# Patient Record
Sex: MALE | Race: WHITE | Employment: FULL TIME
[De-identification: names, ages, dates, MRNs, and addresses within clinical notes are randomized per-mention and may not be internally consistent; named-entity substitution may affect disease eponyms.]

---

## 2019-11-08 ENCOUNTER — HEALTH MAINTENANCE LETTER (OUTPATIENT)
Age: 34
End: 2019-11-08

## 2020-12-06 ENCOUNTER — HEALTH MAINTENANCE LETTER (OUTPATIENT)
Age: 35
End: 2020-12-06

## 2021-09-25 ENCOUNTER — HEALTH MAINTENANCE LETTER (OUTPATIENT)
Age: 36
End: 2021-09-25

## 2021-10-10 ENCOUNTER — APPOINTMENT (OUTPATIENT)
Dept: ULTRASOUND IMAGING | Facility: CLINIC | Age: 36
End: 2021-10-10
Attending: EMERGENCY MEDICINE
Payer: COMMERCIAL

## 2021-10-10 ENCOUNTER — HOSPITAL ENCOUNTER (EMERGENCY)
Facility: CLINIC | Age: 36
Discharge: HOME | End: 2021-10-10
Attending: EMERGENCY MEDICINE | Admitting: EMERGENCY MEDICINE
Payer: COMMERCIAL

## 2021-10-10 VITALS
HEART RATE: 79 BPM | SYSTOLIC BLOOD PRESSURE: 178 MMHG | HEIGHT: 71 IN | BODY MASS INDEX: 25.2 KG/M2 | WEIGHT: 180 LBS | OXYGEN SATURATION: 99 % | TEMPERATURE: 98.4 F | DIASTOLIC BLOOD PRESSURE: 92 MMHG | RESPIRATION RATE: 18 BRPM

## 2021-10-10 DIAGNOSIS — N50.812 TESTICULAR PAIN, LEFT: ICD-10-CM

## 2021-10-10 LAB
ALBUMIN UR-MCNC: NEGATIVE MG/DL
APPEARANCE UR: CLEAR
BILIRUB UR QL STRIP: NEGATIVE
COLOR UR AUTO: NORMAL
GLUCOSE UR STRIP-MCNC: NEGATIVE MG/DL
HGB UR QL STRIP: NEGATIVE
KETONES UR STRIP-MCNC: NEGATIVE MG/DL
LEUKOCYTE ESTERASE UR QL STRIP: NEGATIVE
NITRATE UR QL: NEGATIVE
PH UR STRIP: 7 [PH] (ref 5–7)
SP GR UR STRIP: 1.02 (ref 1–1.03)
UROBILINOGEN UR STRIP-MCNC: NORMAL MG/DL

## 2021-10-10 PROCEDURE — 87591 N.GONORRHOEAE DNA AMP PROB: CPT | Performed by: EMERGENCY MEDICINE

## 2021-10-10 PROCEDURE — 81003 URINALYSIS AUTO W/O SCOPE: CPT | Performed by: EMERGENCY MEDICINE

## 2021-10-10 PROCEDURE — 250N000013 HC RX MED GY IP 250 OP 250 PS 637: Performed by: EMERGENCY MEDICINE

## 2021-10-10 PROCEDURE — 76870 US EXAM SCROTUM: CPT

## 2021-10-10 PROCEDURE — 99284 EMERGENCY DEPT VISIT MOD MDM: CPT | Mod: 25

## 2021-10-10 PROCEDURE — 87491 CHLMYD TRACH DNA AMP PROBE: CPT | Performed by: EMERGENCY MEDICINE

## 2021-10-10 RX ORDER — OXYCODONE HYDROCHLORIDE 5 MG/1
5 TABLET ORAL ONCE
Status: COMPLETED | OUTPATIENT
Start: 2021-10-10 | End: 2021-10-10

## 2021-10-10 RX ADMIN — OXYCODONE HYDROCHLORIDE 5 MG: 5 TABLET ORAL at 19:47

## 2021-10-10 ASSESSMENT — MIFFLIN-ST. JEOR: SCORE: 1778.6

## 2021-10-10 ASSESSMENT — ENCOUNTER SYMPTOMS
ABDOMINAL PAIN: 0
BACK PAIN: 0

## 2021-10-11 LAB
C TRACH DNA SPEC QL NAA+PROBE: NEGATIVE
N GONORRHOEA DNA SPEC QL NAA+PROBE: NEGATIVE

## 2021-10-11 NOTE — RESULT ENCOUNTER NOTE
Final result for both N. Gonorrhoeae PCR and Chlamydia Trachomatis PCR are NEGATIVE.  No treatment or change in treatment per [facility] ED Lab Result N. Gonorrhea AND/OR Chlamydia T. protocol.

## 2021-10-11 NOTE — DISCHARGE INSTRUCTIONS
*Rest, compression and elevation to the scrotum. Avoid straining and heavy lifting.  *Ibuprofen and/or tylenol as directed as needed for pain.  *Follow-up with urology in the next week as needed.  *Return if you develop worsening pain, urinary symptoms, fever, abdominal pain, vomiting or become worse in any way.

## 2021-10-11 NOTE — ED PROVIDER NOTES
"  History   Chief Complaint:  Testicular/scrotal Pain     The history is provided by the patient.      Priyank Kerns is a 36 year old male with history of epididymo-orchitis and left inguinal hernia who presents with testicular/scrotal pain. While driving here from [place] this morning, Priyank experienced a sudden onset of sharp left testicular pain that radiates upwards accompanied by intermittent periods of nausea. He denies experiencing any fever, urinary changes, back pain, or abdominal pain. He mentions history of left inguinal hernia surgery in 2020, although notes today's symptoms do not feel similar and has not had history of experiencing symptoms similar to today's. Reports occasional drinking. No allergies to medications or other medical problems.     Review of Systems   Gastrointestinal: Negative for abdominal pain.   Genitourinary: Positive for testicular pain.   Musculoskeletal: Negative for back pain.   All other systems reviewed and are negative.    Allergies:  The patient has no known allergies.     Medications:  The patient denies currently taking any medications.     Past Medical History:     Orchitis, epididymitis, and epididymo-orchitis   Left inguinal hernia  Quadriceps tendonitis  Syncope episode  Rectal bleeding   Myalgia  Elevated CK  Elevated aldolase level    Past Surgical History:    Inguinal hernia repair     Social History:  Unnacompanied in the ED today.  His  dropped him off and will pick him up.  Occasional drinker, no tobacco use.   Has twin 16-month-old sons.    Physical Exam     Patient Vitals for the past 24 hrs:   BP Temp Temp src Pulse Resp SpO2 Height Weight   10/10/21 1657 (!) 178/92 98.4  F (36.9  C) Oral 79 18 99 % 1.803 m (5' 11\") 81.6 kg (180 lb)     Physical Exam  General: Well-nourished, appears to be uncomfortable when I enter the room  Eyes: PERRL, conjunctivae pink no scleral icterus or conjunctival injection  ENT:  Moist mucus membranes, posterior " oropharynx clear without erythema or exudates  Respiratory:  Lungs clear to auscultation bilaterally, no crackles/rubs/wheezes.  Good air movement  CV: Normal rate and rhythm, no murmurs/rubs/gallops  GI:  Abdomen soft and non-distended.  Normoactive BS.  No tenderness, guarding or rebound  : Circumcised.  No blood or discharge at the meatus.  No sores or redness.  Minimal testicular tenderness on the left.  No right-sided testicular tenderness.  Normal lie and normal cremasteric reflex bilaterally.  No overlying redness or warmth.  No apparent swelling.  No palpable inguinal hernia.  Skin: Warm, dry.  No rashes or petechiae  Musculoskeletal: No peripheral edema or calf tenderness  Neuro: Alert and oriented to person/place/time  Psychiatric: Normal affect      Emergency Department Course     Imaging:  US Testicular & Scrotum w Doppler Ltd  Final Result  IMPRESSION:  1.  Normal ultrasound of the scrotum.  Report per radiology    Laboratory:  UA with microscopic: all WNL     Emergency Department Course:  Reviewed:  I reviewed nursing notes, vitals, past medical history and Care Everywhere    Assessments:  1930 I obtained history and examined the patient as noted above.    I rechecked the patient and explained findings.     Interventions:  1947 Oxycodone 5 mg PO    Disposition:  The patient was discharged to home.     Impression & Plan     Medical Decision Making:  Priyank Kerns is a 36 year old male who comes with testicular pain. On exam there is a normal lie and no clinical evidence of torsion. Ultrasound was done and they do not see any radiologic evidence of torsion. He does have just minimal tenderness over the epididymis on exam, but they do not see any definite epididymitis on ultrasound. I think kidney stone is less likely since he does have palpable pain to the testicle and there is no blood in his urine. There is no evidence of hernia on exam. I think strangulated or incarcerated hernia is less likely  since he is not having any vomiting or stool changes.  Patient denies any concerns for gonorrhea or chlamydia.  Without other evidence of infection, will treat with pain medication along with supportive undergarment, ice, and elevation. He should not do any straining or lifting. The patient should follow up with urology if not improving in the next two to five days. He should return if increasing pain, swelling, fever, or other further concerns.     Diagnosis:    ICD-10-CM    1. Testicular pain, left  N50.812        Discharge Medications:  New Prescriptions    No medications on file       Scribe Disclosure:  MAYO Tiffanie Yu, am serving as a scribe at 7:30 PM on 10/10/2021 to document services personally performed by Jenifer Hansen MD based on my observations and the provider's statements to me.            Jenifer Hansen MD  10/11/21 0145

## 2022-01-15 ENCOUNTER — HEALTH MAINTENANCE LETTER (OUTPATIENT)
Age: 37
End: 2022-01-15

## 2023-01-07 ENCOUNTER — HEALTH MAINTENANCE LETTER (OUTPATIENT)
Age: 38
End: 2023-01-07

## 2023-04-22 ENCOUNTER — HEALTH MAINTENANCE LETTER (OUTPATIENT)
Age: 38
End: 2023-04-22

## 2024-06-29 ENCOUNTER — HEALTH MAINTENANCE LETTER (OUTPATIENT)
Age: 39
End: 2024-06-29